# Patient Record
Sex: FEMALE | Race: WHITE | NOT HISPANIC OR LATINO | Employment: UNEMPLOYED | URBAN - METROPOLITAN AREA
[De-identification: names, ages, dates, MRNs, and addresses within clinical notes are randomized per-mention and may not be internally consistent; named-entity substitution may affect disease eponyms.]

---

## 2020-06-03 ENCOUNTER — TELEPHONE (OUTPATIENT)
Dept: INTERNAL MEDICINE | Facility: CLINIC | Age: 35
End: 2020-06-03

## 2020-06-03 NOTE — TELEPHONE ENCOUNTER
Patient no showed for New Patient appointment on 5/26/2020 with Dr. Sheriff. A message was left 5/26/2020 for patient informing her she no showed for her appointment and we are not able to reschedule a New Patient after a no show. Patients spouse (Tobin) called and said it was her mistake she forgot about the appointment and he understood the policy but he is a  and due to the unknown circumstances (covid-19) he would like for Dr. Sheriff to let her reschedule her appointment. Informed spouse I would have to get with Dr. Sheriff and I will call her back.